# Patient Record
Sex: FEMALE | Race: WHITE | NOT HISPANIC OR LATINO | Employment: OTHER | URBAN - METROPOLITAN AREA
[De-identification: names, ages, dates, MRNs, and addresses within clinical notes are randomized per-mention and may not be internally consistent; named-entity substitution may affect disease eponyms.]

---

## 2023-10-25 ENCOUNTER — APPOINTMENT (OUTPATIENT)
Dept: GENERAL RADIOLOGY | Facility: CLINIC | Age: 78
End: 2023-10-25
Attending: EMERGENCY MEDICINE
Payer: COMMERCIAL

## 2023-10-25 ENCOUNTER — HOSPITAL ENCOUNTER (EMERGENCY)
Facility: CLINIC | Age: 78
Discharge: HOME | End: 2023-10-26
Attending: EMERGENCY MEDICINE | Admitting: EMERGENCY MEDICINE
Payer: COMMERCIAL

## 2023-10-25 VITALS
TEMPERATURE: 98.2 F | HEIGHT: 66 IN | OXYGEN SATURATION: 94 % | RESPIRATION RATE: 18 BRPM | SYSTOLIC BLOOD PRESSURE: 148 MMHG | DIASTOLIC BLOOD PRESSURE: 92 MMHG | BODY MASS INDEX: 27.97 KG/M2 | WEIGHT: 174 LBS | HEART RATE: 90 BPM

## 2023-10-25 DIAGNOSIS — R07.89 CHEST PRESSURE: ICD-10-CM

## 2023-10-25 LAB
ALBUMIN SERPL BCG-MCNC: 4.1 G/DL (ref 3.5–5.2)
ALP SERPL-CCNC: 98 U/L (ref 35–104)
ALT SERPL W P-5'-P-CCNC: 20 U/L (ref 0–50)
ANION GAP SERPL CALCULATED.3IONS-SCNC: 12 MMOL/L (ref 7–15)
AST SERPL W P-5'-P-CCNC: 29 U/L (ref 0–45)
ATRIAL RATE - MUSE: 113 BPM
BASOPHILS # BLD AUTO: 0 10E3/UL (ref 0–0.2)
BASOPHILS NFR BLD AUTO: 0 %
BILIRUB SERPL-MCNC: 0.3 MG/DL
BUN SERPL-MCNC: 10.2 MG/DL (ref 8–23)
CALCIUM SERPL-MCNC: 9.8 MG/DL (ref 8.8–10.2)
CHLORIDE SERPL-SCNC: 103 MMOL/L (ref 98–107)
CREAT SERPL-MCNC: 0.7 MG/DL (ref 0.51–0.95)
DEPRECATED HCO3 PLAS-SCNC: 24 MMOL/L (ref 22–29)
DIASTOLIC BLOOD PRESSURE - MUSE: NORMAL MMHG
EGFRCR SERPLBLD CKD-EPI 2021: 88 ML/MIN/1.73M2
EOSINOPHIL # BLD AUTO: 0.1 10E3/UL (ref 0–0.7)
EOSINOPHIL NFR BLD AUTO: 1 %
ERYTHROCYTE [DISTWIDTH] IN BLOOD BY AUTOMATED COUNT: 14 % (ref 10–15)
GLUCOSE SERPL-MCNC: 117 MG/DL (ref 70–99)
HCT VFR BLD AUTO: 43.1 % (ref 35–47)
HGB BLD-MCNC: 13.5 G/DL (ref 11.7–15.7)
HOLD SPECIMEN: NORMAL
IMM GRANULOCYTES # BLD: 0 10E3/UL
IMM GRANULOCYTES NFR BLD: 0 %
INTERPRETATION ECG - MUSE: NORMAL
LYMPHOCYTES # BLD AUTO: 3.4 10E3/UL (ref 0.8–5.3)
LYMPHOCYTES NFR BLD AUTO: 33 %
MCH RBC QN AUTO: 27.1 PG (ref 26.5–33)
MCHC RBC AUTO-ENTMCNC: 31.3 G/DL (ref 31.5–36.5)
MCV RBC AUTO: 86 FL (ref 78–100)
MONOCYTES # BLD AUTO: 0.8 10E3/UL (ref 0–1.3)
MONOCYTES NFR BLD AUTO: 8 %
NEUTROPHILS # BLD AUTO: 6 10E3/UL (ref 1.6–8.3)
NEUTROPHILS NFR BLD AUTO: 58 %
NRBC # BLD AUTO: 0 10E3/UL
NRBC BLD AUTO-RTO: 0 /100
NT-PROBNP SERPL-MCNC: 171 PG/ML (ref 0–1800)
P AXIS - MUSE: 37 DEGREES
PLATELET # BLD AUTO: 209 10E3/UL (ref 150–450)
POTASSIUM SERPL-SCNC: 4.5 MMOL/L (ref 3.4–5.3)
PR INTERVAL - MUSE: 168 MS
PROT SERPL-MCNC: 7.8 G/DL (ref 6.4–8.3)
QRS DURATION - MUSE: 114 MS
QT - MUSE: 346 MS
QTC - MUSE: 474 MS
R AXIS - MUSE: 261 DEGREES
RBC # BLD AUTO: 4.99 10E6/UL (ref 3.8–5.2)
SODIUM SERPL-SCNC: 139 MMOL/L (ref 135–145)
SYSTOLIC BLOOD PRESSURE - MUSE: NORMAL MMHG
T AXIS - MUSE: -19 DEGREES
TROPONIN T SERPL HS-MCNC: 10 NG/L
VENTRICULAR RATE- MUSE: 113 BPM
WBC # BLD AUTO: 10.4 10E3/UL (ref 4–11)

## 2023-10-25 PROCEDURE — 93005 ELECTROCARDIOGRAM TRACING: CPT

## 2023-10-25 PROCEDURE — 83880 ASSAY OF NATRIURETIC PEPTIDE: CPT | Performed by: EMERGENCY MEDICINE

## 2023-10-25 PROCEDURE — 71046 X-RAY EXAM CHEST 2 VIEWS: CPT

## 2023-10-25 PROCEDURE — 36415 COLL VENOUS BLD VENIPUNCTURE: CPT | Performed by: EMERGENCY MEDICINE

## 2023-10-25 PROCEDURE — 99285 EMERGENCY DEPT VISIT HI MDM: CPT | Mod: 25

## 2023-10-25 PROCEDURE — 80053 COMPREHEN METABOLIC PANEL: CPT | Performed by: EMERGENCY MEDICINE

## 2023-10-25 PROCEDURE — 250N000013 HC RX MED GY IP 250 OP 250 PS 637: Performed by: EMERGENCY MEDICINE

## 2023-10-25 PROCEDURE — 84484 ASSAY OF TROPONIN QUANT: CPT | Performed by: EMERGENCY MEDICINE

## 2023-10-25 PROCEDURE — 85025 COMPLETE CBC W/AUTO DIFF WBC: CPT | Performed by: EMERGENCY MEDICINE

## 2023-10-25 RX ORDER — ASPIRIN 81 MG/1
162 TABLET, CHEWABLE ORAL ONCE
Status: COMPLETED | OUTPATIENT
Start: 2023-10-25 | End: 2023-10-25

## 2023-10-25 RX ADMIN — ASPIRIN 81 MG CHEWABLE TABLET 162 MG: 81 TABLET CHEWABLE at 22:48

## 2023-10-25 ASSESSMENT — ACTIVITIES OF DAILY LIVING (ADL): ADLS_ACUITY_SCORE: 35

## 2023-10-25 NOTE — ED TRIAGE NOTES
Patient was seen at the clinic earlier today and had an EKG done there. Patient was sent from the clinic to the ED. Patient states she has swollen legs and chest tightness.     Triage Assessment (Adult)       Row Name 10/25/23 7213          Triage Assessment    Airway WDL WDL        Respiratory WDL    Respiratory WDL WDL        Skin Circulation/Temperature WDL    Skin Circulation/Temperature WDL WDL        Cardiac WDL    Cardiac WDL WDL        Peripheral/Neurovascular WDL    Peripheral Neurovascular WDL WDL        Cognitive/Neuro/Behavioral WDL    Cognitive/Neuro/Behavioral WDL WDL

## 2023-10-26 LAB — TROPONIN T SERPL HS-MCNC: 8 NG/L

## 2023-10-26 NOTE — DISCHARGE INSTRUCTIONS
As we discussed, no clear cause of her chest pressure was found here today, though it is reassuring that your troponins are negative.  We do not see any evidence of this having a heart attack, and since you do not have any pain here, and overall doing well I do that you are stable to follow with your regular doctor in the next 1 week.  This is very important to do however, as you may need additional testing and studies.  Come back to the ER immediately with any concerns you have, any cough or if you feel like you are short of breath, as you have told us here today that you are not with any shortness of breath or pleurisy.  Come back with any other concerns you have and do the significance of with your regular doctor in the week ahead

## 2023-10-26 NOTE — ED PROVIDER NOTES
"  History     Chief Complaint:  Chest Pain       The history is provided by the patient.      Erica Koenig is a 78 year old female who presents with chest pain. She started having chest heaviness today so she went to a [facility] clinic and got an EKG there. They advised her to come into the ED. She started having the chest heaviness today and it's intermittent. Nothing makes it worse. Denies chest pain, shortness of breath, vomiting, nausea, diarrhea, or abdominal pain.     Denies the idea of chest pain, simply states that it feels like a possible pressure, specifically states that she did not have any of this yesterday, and also notes that she is quite concerned about some chronic lower extremity edema that has been worked up for the last several months.  She denies any cough or hemoptysis, denies any current chest discomfort of any kind    Independent Historian:   Yes,  is at bedside and confirms above history    Review of External Notes: Yes I reviewed the patient's office visit from earlier today with her family medicine provider where she was seen with chest pressure and sent here to the ER.      Allergies:  No Known Allergies     Medications:    No current outpatient medications on file.      Past Medical History:    No past medical history on file.    Past Surgical History:    No past surgical history on file.     Family History:    family history is not on file.    Social History:     PCP: No primary care provider on file.     Physical Exam   Patient Vitals for the past 24 hrs:   BP Temp Temp src Pulse Resp SpO2 Height Weight   10/25/23 1636 (!) 167/81 98.2  F (36.8  C) Tympanic 112 18 99 % -- --   10/25/23 1634 -- -- -- -- -- -- 1.676 m (5' 6\") 78.9 kg (174 lb)        Physical Exam  Vitals: reviewed by me  General: Pt seen on [facility], pleasant, cooperative, and alert to conversation  Eyes: Tracking well, clear conjunctiva BL  ENT: MMM, midline trachea.   Lungs: No tachypnea, no " accessory muscle use. No respiratory distress.  Lungs are clear to auscultation bilaterally  CV: Rate as above, normal S1-S2, no additional heart sounds.  Abd: Soft, non tender, no guarding, no rebound. Non distended  MSK: no joint effusion.  No evidence of trauma  Skin: No rash  Neuro: Clear speech and no facial droop.  Psych: Not RIS, no e/o AH/VH          Emergency Department Course     ECG results from 10/25/23   EKG 12-lead, tracing only     Value    Systolic Blood Pressure     Diastolic Blood Pressure     Ventricular Rate 113    Atrial Rate 113    NJ Interval 168    QRS Duration 114        QTc 474    P Axis 37    R AXIS 261    T Axis -19    Interpretation ECG      Sinus tachycardia  Right bundle branch block  Abnormal ECG  No previous ECGs available  Confirmed by GENERATED REPORT, COMPUTER ([id number]),  Sophie Donnelly ([id number]) on 10/25/2023 5:55:02 PM           Imaging:  XR Chest 2 Views   Final Result   IMPRESSION: Heart is normal in size. Lungs are hyperinflated compatible with COPD. Lungs are otherwise clear.          Report per radiology    Laboratory:  Labs Ordered and Resulted from Time of ED Arrival to Time of ED Departure   COMPREHENSIVE METABOLIC PANEL - Abnormal       Result Value    Sodium 139      Potassium 4.5      Carbon Dioxide (CO2) 24      Anion Gap 12      Urea Nitrogen 10.2      Creatinine 0.70      GFR Estimate 88      Calcium 9.8      Chloride 103      Glucose 117 (*)     Alkaline Phosphatase 98      AST 29      ALT 20      Protein Total 7.8      Albumin 4.1      Bilirubin Total 0.3     CBC WITH PLATELETS AND DIFFERENTIAL - Abnormal    WBC Count 10.4      RBC Count 4.99      Hemoglobin 13.5      Hematocrit 43.1      MCV 86      MCH 27.1      MCHC 31.3 (*)     RDW 14.0      Platelet Count 209      % Neutrophils 58      % Lymphocytes 33      % Monocytes 8      % Eosinophils 1      % Basophils 0      % Immature Granulocytes 0      NRBCs per 100 WBC 0      Absolute Neutrophils 6.0       Absolute Lymphocytes 3.4      Absolute Monocytes 0.8      Absolute Eosinophils 0.1      Absolute Basophils 0.0      Absolute Immature Granulocytes 0.0      Absolute NRBCs 0.0     TROPONIN T, HIGH SENSITIVITY - Normal    Troponin T, High Sensitivity 10     NT PROBNP INPATIENT - Normal    N terminal Pro BNP Inpatient 171     TROPONIN T, HIGH SENSITIVITY - Normal    Troponin T, High Sensitivity 8              Emergency Department Course & Assessments:    Interventions:  Medications   aspirin (ASA) chewable tablet 162 mg (has no administration in time range)          Independent Interpretation (X-rays, CTs, rhythm strip):  Yes I have independently reviewed the x-ray of her chest, no obvious pneumothorax noted    Consultations/Discussion of Management or Tests:  2159 I examined the patient and obtained history as noted above.    Social Determinants of Health affecting care:   Stress/Adjustment Disorders      Disposition:  The patient was discharged to home.     Impression & Plan        Medical Decision Making:  This is a very pleasant 78-year-old female who presents to the emergency room with appears to be chest pressure earlier in the day.  At no point was exertional and her pressure and discomfort has essentially dissipated since has been in the ER and she has no discomfort of any kind at time of disposition.  She denies any pain.  Profile not consistent with dissection or pneumothorax.  She also denies any pleurisy, any shortness of breath, any cough or hemoptysis.  She does look to have a history of COPD based on the x-ray, and from what I can gather looking back at her vital signs they are typically with a saturation in the mid 90s with regards to her oxygenation.  I do not think that she needs to stay here in the hospital, both her and her  are quite well-appearing and very reliable appearing and quite lively.  I do think that they are stable to follow in the outpatient setting with her regular doctor, and I  discussed how important this is, as she may benefit from a stress test or additional testing if this issue recurs.  Red flags for when to come back to the ER were discussed in detail, patient and  at bedside are okay with this plan.    Diagnosis:    ICD-10-CM    1. Chest pressure  R07.89            Scribe Disclosure:  I, Cipriano Suresh, am serving as a scribe at 10:05 PM on 10/25/2023 to document services personally performed by David Stewart MD based on my observations and the provider's statements to me.    10/25/2023   David Stewart MD Fitzgerald, Michael Maxwell Kreofsky, MD  10/26/23 0038

## 2024-02-17 ENCOUNTER — APPOINTMENT (OUTPATIENT)
Dept: ULTRASOUND IMAGING | Facility: CLINIC | Age: 79
End: 2024-02-17
Attending: EMERGENCY MEDICINE
Payer: COMMERCIAL

## 2024-02-17 ENCOUNTER — HOSPITAL ENCOUNTER (EMERGENCY)
Facility: CLINIC | Age: 79
Discharge: HOME | End: 2024-02-17
Attending: EMERGENCY MEDICINE | Admitting: EMERGENCY MEDICINE
Payer: COMMERCIAL

## 2024-02-17 ENCOUNTER — APPOINTMENT (OUTPATIENT)
Dept: CT IMAGING | Facility: CLINIC | Age: 79
End: 2024-02-17
Attending: EMERGENCY MEDICINE
Payer: COMMERCIAL

## 2024-02-17 VITALS
RESPIRATION RATE: 11 BRPM | SYSTOLIC BLOOD PRESSURE: 145 MMHG | BODY MASS INDEX: 28.12 KG/M2 | HEART RATE: 102 BPM | WEIGHT: 175 LBS | OXYGEN SATURATION: 96 % | TEMPERATURE: 97.8 F | DIASTOLIC BLOOD PRESSURE: 77 MMHG | HEIGHT: 66 IN

## 2024-02-17 DIAGNOSIS — R06.02 SOB (SHORTNESS OF BREATH): ICD-10-CM

## 2024-02-17 DIAGNOSIS — M79.604 CHRONIC PAIN OF BOTH LOWER EXTREMITIES: ICD-10-CM

## 2024-02-17 DIAGNOSIS — G89.29 CHRONIC PAIN OF BOTH LOWER EXTREMITIES: ICD-10-CM

## 2024-02-17 DIAGNOSIS — R91.8 PULMONARY NODULES: ICD-10-CM

## 2024-02-17 DIAGNOSIS — M79.605 CHRONIC PAIN OF BOTH LOWER EXTREMITIES: ICD-10-CM

## 2024-02-17 LAB
ANION GAP SERPL CALCULATED.3IONS-SCNC: 12 MMOL/L (ref 7–15)
ATRIAL RATE - MUSE: 113 BPM
BASOPHILS # BLD AUTO: 0 10E3/UL (ref 0–0.2)
BASOPHILS NFR BLD AUTO: 1 %
BUN SERPL-MCNC: 11.5 MG/DL (ref 8–23)
CALCIUM SERPL-MCNC: 9.5 MG/DL (ref 8.8–10.2)
CHLORIDE SERPL-SCNC: 101 MMOL/L (ref 98–107)
CREAT SERPL-MCNC: 0.77 MG/DL (ref 0.51–0.95)
D DIMER PPP FEU-MCNC: 1.05 UG/ML FEU (ref 0–0.5)
DEPRECATED HCO3 PLAS-SCNC: 26 MMOL/L (ref 22–29)
DIASTOLIC BLOOD PRESSURE - MUSE: NORMAL MMHG
EGFRCR SERPLBLD CKD-EPI 2021: 79 ML/MIN/1.73M2
EOSINOPHIL # BLD AUTO: 0.2 10E3/UL (ref 0–0.7)
EOSINOPHIL NFR BLD AUTO: 2 %
ERYTHROCYTE [DISTWIDTH] IN BLOOD BY AUTOMATED COUNT: 14.5 % (ref 10–15)
GLUCOSE SERPL-MCNC: 116 MG/DL (ref 70–99)
HCT VFR BLD AUTO: 40.2 % (ref 35–47)
HGB BLD-MCNC: 13 G/DL (ref 11.7–15.7)
HOLD SPECIMEN: NORMAL
HOLD SPECIMEN: NORMAL
IMM GRANULOCYTES # BLD: 0 10E3/UL
IMM GRANULOCYTES NFR BLD: 0 %
INTERPRETATION ECG - MUSE: NORMAL
LYMPHOCYTES # BLD AUTO: 3.6 10E3/UL (ref 0.8–5.3)
LYMPHOCYTES NFR BLD AUTO: 42 %
MCH RBC QN AUTO: 27.1 PG (ref 26.5–33)
MCHC RBC AUTO-ENTMCNC: 32.3 G/DL (ref 31.5–36.5)
MCV RBC AUTO: 84 FL (ref 78–100)
MONOCYTES # BLD AUTO: 0.7 10E3/UL (ref 0–1.3)
MONOCYTES NFR BLD AUTO: 8 %
NEUTROPHILS # BLD AUTO: 3.9 10E3/UL (ref 1.6–8.3)
NEUTROPHILS NFR BLD AUTO: 47 %
NRBC # BLD AUTO: 0 10E3/UL
NRBC BLD AUTO-RTO: 0 /100
P AXIS - MUSE: 55 DEGREES
PLATELET # BLD AUTO: 263 10E3/UL (ref 150–450)
POTASSIUM SERPL-SCNC: 3.6 MMOL/L (ref 3.4–5.3)
PR INTERVAL - MUSE: 176 MS
QRS DURATION - MUSE: 80 MS
QT - MUSE: 342 MS
QTC - MUSE: 469 MS
R AXIS - MUSE: -39 DEGREES
RBC # BLD AUTO: 4.79 10E6/UL (ref 3.8–5.2)
SODIUM SERPL-SCNC: 139 MMOL/L (ref 135–145)
SYSTOLIC BLOOD PRESSURE - MUSE: NORMAL MMHG
T AXIS - MUSE: 8 DEGREES
TROPONIN T SERPL HS-MCNC: 10 NG/L
TROPONIN T SERPL HS-MCNC: 9 NG/L
VENTRICULAR RATE- MUSE: 113 BPM
WBC # BLD AUTO: 8.4 10E3/UL (ref 4–11)

## 2024-02-17 PROCEDURE — 250N000011 HC RX IP 250 OP 636: Performed by: EMERGENCY MEDICINE

## 2024-02-17 PROCEDURE — 93970 EXTREMITY STUDY: CPT

## 2024-02-17 PROCEDURE — 99285 EMERGENCY DEPT VISIT HI MDM: CPT | Mod: 25

## 2024-02-17 PROCEDURE — 80048 BASIC METABOLIC PNL TOTAL CA: CPT | Performed by: EMERGENCY MEDICINE

## 2024-02-17 PROCEDURE — 93005 ELECTROCARDIOGRAM TRACING: CPT

## 2024-02-17 PROCEDURE — 84484 ASSAY OF TROPONIN QUANT: CPT | Performed by: EMERGENCY MEDICINE

## 2024-02-17 PROCEDURE — 36415 COLL VENOUS BLD VENIPUNCTURE: CPT | Performed by: EMERGENCY MEDICINE

## 2024-02-17 PROCEDURE — 250N000013 HC RX MED GY IP 250 OP 250 PS 637: Performed by: EMERGENCY MEDICINE

## 2024-02-17 PROCEDURE — 85379 FIBRIN DEGRADATION QUANT: CPT | Performed by: EMERGENCY MEDICINE

## 2024-02-17 PROCEDURE — 71275 CT ANGIOGRAPHY CHEST: CPT

## 2024-02-17 PROCEDURE — 85025 COMPLETE CBC W/AUTO DIFF WBC: CPT | Performed by: EMERGENCY MEDICINE

## 2024-02-17 PROCEDURE — 250N000009 HC RX 250: Performed by: EMERGENCY MEDICINE

## 2024-02-17 RX ORDER — ACETAMINOPHEN 500 MG
1000 TABLET ORAL ONCE
Status: COMPLETED | OUTPATIENT
Start: 2024-02-17 | End: 2024-02-17

## 2024-02-17 RX ORDER — IOPAMIDOL 755 MG/ML
66 INJECTION, SOLUTION INTRAVASCULAR ONCE
Status: COMPLETED | OUTPATIENT
Start: 2024-02-17 | End: 2024-02-17

## 2024-02-17 RX ORDER — ASPIRIN 81 MG/1
324 TABLET, CHEWABLE ORAL ONCE
Status: COMPLETED | OUTPATIENT
Start: 2024-02-17 | End: 2024-02-17

## 2024-02-17 RX ADMIN — SODIUM CHLORIDE 91 ML: 9 INJECTION, SOLUTION INTRAVENOUS at 04:06

## 2024-02-17 RX ADMIN — IOPAMIDOL 66 ML: 755 INJECTION, SOLUTION INTRAVENOUS at 04:06

## 2024-02-17 RX ADMIN — ACETAMINOPHEN 1000 MG: 500 TABLET, FILM COATED ORAL at 03:37

## 2024-02-17 RX ADMIN — ASPIRIN 81 MG CHEWABLE TABLET 324 MG: 81 TABLET CHEWABLE at 04:14

## 2024-02-17 ASSESSMENT — ACTIVITIES OF DAILY LIVING (ADL)
ADLS_ACUITY_SCORE: 35
ADLS_ACUITY_SCORE: 35

## 2024-02-17 NOTE — ED TRIAGE NOTES
Patient here  with chest pain and shortness of breath which started tonight. She also c/o swollen feet with a rash.patient c/o feeling shaky     Triage Assessment (Adult)       Row Name 02/17/24 0245          Triage Assessment    Airway WDL WDL        Respiratory WDL    Respiratory WDL WDL        Skin Circulation/Temperature WDL    Skin Circulation/Temperature WDL WDL        Cardiac WDL    Cardiac WDL WDL        Peripheral/Neurovascular WDL    Peripheral Neurovascular WDL WDL        Cognitive/Neuro/Behavioral WDL    Cognitive/Neuro/Behavioral WDL WDL

## 2024-02-17 NOTE — ED NOTES
Pt ambulated to restroom with RN. Pt had a steady gait. Denied chest pain and dizziness/lightheadedness. Pt was slightly short of breath when brought back to room, but pt reported feeling much better than when she came in.

## 2024-02-17 NOTE — ED PROVIDER NOTES
"  History     Chief Complaint:  Chest Pain       HPI   Erica Koenig is a 78 year old female who presents with chest pain, feet pain. The patient states that she has been dealing with burning, swelling in her feet for a long time which keeps her up at night. Her feet were keeping her up through the night, she began to develop central chest heaviness/tightness that she rates as a 6/10, shortness of breath, light-headedness, shaking. She denies any cough, congestion, fever, nausea, diaphoresis.     Independent Historian:   None - Patient Only    Review of External Notes:   I reviewed ED note from 10/25/23 where the patient was seen with chest pressure       Medications:    Lasix   Valium     Past Medical History:    Onychomycosis   Back pain   Osteoporosis   Breast cancer     Past Surgical History:    Breast lumpectomy   Mastectomy, partial      Physical Exam   Patient Vitals for the past 24 hrs:   BP Temp Temp src Pulse Resp SpO2 Height Weight   02/17/24 0636 (!) 145/77 -- -- 102 11 96 % -- --   02/17/24 0545 -- -- -- 98 11 96 % -- --   02/17/24 0530 128/76 -- -- 92 18 95 % -- --   02/17/24 0500 (!) 149/76 -- -- 101 18 97 % -- --   02/17/24 0445 -- -- -- 98 18 94 % -- --   02/17/24 0430 (!) 179/95 -- -- 102 21 95 % -- --   02/17/24 0415 (!) 163/86 -- -- 91 22 96 % -- --   02/17/24 0345 -- -- -- 89 13 97 % -- --   02/17/24 0315 -- -- -- 93 15 98 % -- --   02/17/24 0300 (!) 175/97 -- -- 108 11 -- -- --   02/17/24 0250 -- -- -- 104 -- 100 % -- --   02/17/24 0243 (!) 186/94 97.8  F (36.6  C) Oral -- 20 -- 1.676 m (5' 6\") 79.4 kg (175 lb)        Physical Exam  General: Appears well-developed and well-nourished.   Head: No signs of trauma.   CV: Mild tachycardia and regular rhythm.    Resp: Effort normal and breath sounds normal. No respiratory distress.   GI: Soft. There is no tenderness.  No rebound or guarding.  Normal bowel sounds.  No CVA tenderness.  MSK: Normal range of motion. 1-2+ bilateral ankle edema. No Calf " tenderness to palpation.  Neuro: The patient is alert and oriented.  Strength in upper/lower extremities normal and symmetrical. Sensation normal. Speech normal.  Skin: Skin is warm and dry. No concerning acute rash  Psych: Appears anxious      Emergency Department Course   ECG  ECG taken at 0239, ECG read at 0243  Sinus tachycardia with premature atrial complexes   Left axis deviation   Pulmonary disease pattern  Nonspecific ST abnormality   Abnormal ECG   Rate 113 bpm. MT interval 176 ms. QRS duration 80 ms. QT/QTc 342/469 ms. P-R-T axes 55 -39 8.     Imaging:  US Lower Extremity Venous Duplex Bilateral   Final Result   IMPRESSION:   1.  No deep venous thrombosis in the bilateral lower extremities.      CT Chest Pulmonary Embolism w Contrast   Final Result   IMPRESSION:      1.  No acute findings in the chest including pulmonary embolism or pneumonia.      2.  Scattered bilateral pulmonary nodules measuring 5 mm or less. Per Fleischner Society 2017 guideline, a one-year follow-up chest CT could be considered if patient is at high risk for lung cancer. Without lung cancer risk factor, no follow-up is    necessary.      3.  Mild cardiomegaly.           Laboratory:  Labs Ordered and Resulted from Time of ED Arrival to Time of ED Departure   BASIC METABOLIC PANEL - Abnormal       Result Value    Sodium 139      Potassium 3.6      Chloride 101      Carbon Dioxide (CO2) 26      Anion Gap 12      Urea Nitrogen 11.5      Creatinine 0.77      GFR Estimate 79      Calcium 9.5      Glucose 116 (*)    D DIMER QUANTITATIVE - Abnormal    D-Dimer Quantitative 1.05 (*)    TROPONIN T, HIGH SENSITIVITY - Normal    Troponin T, High Sensitivity 9     TROPONIN T, HIGH SENSITIVITY - Normal    Troponin T, High Sensitivity 10     CBC WITH PLATELETS AND DIFFERENTIAL    WBC Count 8.4      RBC Count 4.79      Hemoglobin 13.0      Hematocrit 40.2      MCV 84      MCH 27.1      MCHC 32.3      RDW 14.5      Platelet Count 263      %  Neutrophils 47      % Lymphocytes 42      % Monocytes 8      % Eosinophils 2      % Basophils 1      % Immature Granulocytes 0      NRBCs per 100 WBC 0      Absolute Neutrophils 3.9      Absolute Lymphocytes 3.6      Absolute Monocytes 0.7      Absolute Eosinophils 0.2      Absolute Basophils 0.0      Absolute Immature Granulocytes 0.0      Absolute NRBCs 0.0        Emergency Department Course & Assessments:       Interventions:  Medications   acetaminophen (TYLENOL) tablet 1,000 mg (1,000 mg Oral $Given 2/17/24 0337)   iopamidol (ISOVUE-370) solution 66 mL (66 mLs Intravenous $Given 2/17/24 0406)   Saline Flush (91 mLs Intravenous $Given 2/17/24 0406)   aspirin (ASA) chewable tablet 324 mg (324 mg Oral $Given 2/17/24 0414)      Assessments:  0315 I examined the patient and obtained history as shown above  0351 I rechecked the patient   0433 I rechecked the patient     Independent Interpretation (X-rays, CTs, rhythm strip):  On my independent interpretation of CT PE there is no pneumothorax      Consultations/Discussion of Management or Tests:  None        Social Determinants of Health affecting care:   None    Disposition:  The patient was discharged.     Impression & Plan    CMS Diagnoses: None    Medical Decision Making:  rEica Koenig presents with chest pressure and shortness of breath along with chronic leg pain.  She reports that she has chronic pain in the feet and legs along with swelling this been going on for quite some time.  Per the patient and on chart review, she does see a pain and back specialist for her leg symptoms.  She reports that she has been having difficulty sleeping at night secondary to her leg symptoms, and tonight she then developed some chest pressure and shortness of breath.  An EKG was obtained upon arrival which did not show concerning ST segment changes or arrhythmias.  Blood work was obtained including serial troponins which were negative.  D-dimer was mildly elevated, so I did  plan a CT PE protocol.  This did not show any signs of a PE or other acute process.  I did show some pulmonary nodules, which I informed the patient of and recommended follow-up.  I also obtained ultrasound of the bilateral lower extremities which did not show any signs of DVT.  Over time, the patient did calm down and the chest symptoms resolved.  Patient reports that she felt like she may have become anxious and stressed secondary to not sleeping, which certainly could account for her symptoms, although I did discuss that there is no test for this in the emergency department.  Given the improvement of her symptoms and reassuring exam, I felt that she was appropriate for discharge home.  I did recommend that she continue to work with her care team regarding her chronic symptoms, and she was instructed to return to the ER for any worsening chest pain, difficulty breathing, or further concerns.      Diagnosis:    ICD-10-CM    1. Pulmonary nodules  R91.8       2. SOB (shortness of breath)  R06.02       3. Chronic pain of both lower extremities  M79.604     M79.605     G89.29            Discharge Medications:  New Prescriptions    No medications on file      Scribe Disclosure:  I, Wes Koehler, am serving as a scribe at 3:24 AM on 2/17/2024 to document services personally performed by Rafael Wasserman MD based on my observations and the provider's statements to me.     2/17/2024   Rafael Wasserman MD Bergenstal, John A, MD  02/17/24 0648

## 2024-02-17 NOTE — DISCHARGE INSTRUCTIONS
Your evaluation in the emergency department was reassuring.  Please continue to work with your doctor regarding your symptoms and return to the ER for any further concerns.